# Patient Record
Sex: FEMALE | Race: WHITE | NOT HISPANIC OR LATINO | Employment: FULL TIME | URBAN - METROPOLITAN AREA
[De-identification: names, ages, dates, MRNs, and addresses within clinical notes are randomized per-mention and may not be internally consistent; named-entity substitution may affect disease eponyms.]

---

## 2017-03-08 ENCOUNTER — TRANSCRIBE ORDERS (OUTPATIENT)
Dept: OBSTETRICS AND GYNECOLOGY | Facility: CLINIC | Age: 52
End: 2017-03-08

## 2017-03-08 DIAGNOSIS — Z12.31 VISIT FOR SCREENING MAMMOGRAM: Primary | ICD-10-CM

## 2017-03-17 PROBLEM — Z01.419 WELL WOMAN EXAM WITH ROUTINE GYNECOLOGICAL EXAM: Chronic | Status: ACTIVE | Noted: 2017-03-17

## 2017-04-03 ENCOUNTER — HOSPITAL ENCOUNTER (OUTPATIENT)
Dept: MAMMOGRAPHY | Facility: HOSPITAL | Age: 52
Discharge: HOME | End: 2017-04-03
Attending: OBSTETRICS & GYNECOLOGY | Admitting: OBSTETRICS & GYNECOLOGY

## 2017-04-03 DIAGNOSIS — Z12.31 VISIT FOR SCREENING MAMMOGRAM: ICD-10-CM

## 2017-04-03 PROCEDURE — 77067 SCR MAMMO BI INCL CAD: CPT | Performed by: RADIOLOGY

## 2017-04-03 PROCEDURE — 77063 BREAST TOMOSYNTHESIS BI: CPT | Performed by: RADIOLOGY

## 2017-04-03 PROCEDURE — G0202 SCR MAMMO BI INCL CAD: HCPCS

## 2017-04-03 PROCEDURE — 77063 BREAST TOMOSYNTHESIS BI: CPT

## 2017-11-21 ENCOUNTER — OFFICE VISIT (OUTPATIENT)
Dept: OBSTETRICS AND GYNECOLOGY | Facility: CLINIC | Age: 52
End: 2017-11-21

## 2017-11-21 VITALS
BODY MASS INDEX: 36.15 KG/M2 | HEIGHT: 65 IN | SYSTOLIC BLOOD PRESSURE: 138 MMHG | WEIGHT: 217 LBS | DIASTOLIC BLOOD PRESSURE: 80 MMHG

## 2017-11-21 DIAGNOSIS — Z01.419 WOMEN'S ANNUAL ROUTINE GYNECOLOGICAL EXAMINATION: Primary | ICD-10-CM

## 2017-11-21 PROBLEM — J30.2 CHRONIC SEASONAL ALLERGIC RHINITIS: Status: ACTIVE | Noted: 2017-11-21

## 2017-11-21 PROBLEM — Z97.5 IUD (INTRAUTERINE DEVICE) IN PLACE: Status: ACTIVE | Noted: 2017-11-21

## 2017-11-21 PROBLEM — E03.9 HYPOTHYROID: Status: ACTIVE | Noted: 2017-11-21

## 2017-11-21 PROBLEM — I10 HYPERTENSION: Status: ACTIVE | Noted: 2017-11-21

## 2017-11-21 PROCEDURE — 99396 PREV VISIT EST AGE 40-64: CPT | Performed by: OBSTETRICS & GYNECOLOGY

## 2017-11-21 PROCEDURE — 82272 OCCULT BLD FECES 1-3 TESTS: CPT | Performed by: OBSTETRICS & GYNECOLOGY

## 2017-11-21 RX ORDER — LEVOTHYROXINE SODIUM 0.1 MG/1
100 TABLET ORAL DAILY
COMMUNITY

## 2017-11-21 RX ORDER — LISINOPRIL 30 MG/1
TABLET ORAL
COMMUNITY
Start: 2017-11-11 | End: 2019-11-14

## 2017-11-21 RX ORDER — MONTELUKAST SODIUM 10 MG/1
TABLET ORAL
COMMUNITY
Start: 2017-11-08

## 2017-11-21 RX ORDER — METOPROLOL SUCCINATE 25 MG/1
TABLET, EXTENDED RELEASE ORAL
COMMUNITY
Start: 2017-11-08

## 2017-11-21 NOTE — PROGRESS NOTES
"Subjective   Chief Complaint   Patient presents with   • Annual Exam     no problem     Jasmin Zazueta is a 52 y.o. year old  menopausal female presenting to be seen for her annual exam.  There has not been vaginal bleeding in the last 12 months.  Hot flashes and night sweats are not a significant problem.  She has some hot flashes last year but these have resolved.  Not had a period in years but she's had a Mirena IUD in since 2012.  Could be part of the issue.  No problem with that we can leave this in place a couple more years easily.      Currently dealing with hypertension and so she lost weight.  Also caring for her 95-year-old father. going to [place] this winter so  She will be able to go back to the gym and exercise more.  Encouraged her to walk on day she is not at the gym.    SEXUAL Hx:  She is sexually active.  Vaginal dryness is a problem. OTC lubricant helps  HEALTH Hx:  She exercises regularly: yes. Spinning 2x weekly ; walks  She wears her seat belt:yes.  She has concerns about domestic violence: no.  She has noticed changes in height: no.              Calcium intake is adequate    The following portions of the patient's history were reviewed and updated as appropriate:problem list, current medications, allergies, past family history, past medical history, past social history and past surgical history.    Smoking status: Never Smoker                                                              Smokeless status: Never Used                        Review of Systems   Her bowels and bladder are normal; increased BP and lost 10 lbs on no carbs     Objective   /80  Ht 64.5\" (163.8 cm)  Wt 217 lb (98.4 kg)  LMP Comment: mirena  BMI 36.67 kg/m2     General:  well developed; well nourished  no acute distress  appears stated age   Skin:  No suspicious lesions seen   Thyroid: not examined   Breasts:  Examined in supine position  Symmetric without masses or skin dimpling  Nipples normal " without inversion, lesions or discharge  There are no palpable axillary nodes   Abdomen: soft, non-tender; no masses  no umbilical or inginual hernias are present  no hepato-splenomegaly   Pelvis: Clinical staff was present for exam  External genitalia:  normal appearance of the external genitalia including Bartholin's and [place]'s glands.  :  urethral meatus normal;  Uterus:  normal size, shape and consistency.  Adnexa:  non palpable bilaterally.  Rectal:  anus visually normal appearing. recto-vaginal exam unremarkable and confirms findings; good sphincter tone; no masses; guaiac negative;        Assessment   1.  perimenopausal  2.  amenorrhea possibly due to menopause versus IUD in place  3.  hypertension/overweight  4.  last Pap co-test 2015 for repeat next year  5.  mammograms up-to-date     Plan   1. Calcium discussed  1200 mg daily in divided doses ideally in diet  2. Regular weight bearing exercise  3. Breast self awareness, mammograms discussed  4.  annual.  Leave IUD in place for now    New Medications Ordered This Visit   Medications   • lisinopril (PRINIVIL,ZESTRIL) 30 MG tablet   • metoprolol succinate XL (TOPROL-XL) 25 MG 24 hr tablet   • montelukast (SINGULAIR) 10 MG tablet   • levothyroxine (SYNTHROID, LEVOTHROID) 100 MCG tablet     Sig: Take 100 mcg by mouth Daily.           This note was electronically signed.    Manoj Jones M.D.  November 21, 2017

## 2018-03-20 ENCOUNTER — LAB REQUISITION (OUTPATIENT)
Dept: LAB | Facility: HOSPITAL | Age: 53
End: 2018-03-20

## 2018-03-20 DIAGNOSIS — Z00.00 ROUTINE GENERAL MEDICAL EXAMINATION AT A HEALTH CARE FACILITY: ICD-10-CM

## 2019-11-14 ENCOUNTER — OFFICE VISIT (OUTPATIENT)
Dept: OBSTETRICS AND GYNECOLOGY | Facility: CLINIC | Age: 54
End: 2019-11-14

## 2019-11-14 VITALS
DIASTOLIC BLOOD PRESSURE: 80 MMHG | BODY MASS INDEX: 36.82 KG/M2 | SYSTOLIC BLOOD PRESSURE: 136 MMHG | WEIGHT: 221 LBS | HEIGHT: 65 IN

## 2019-11-14 DIAGNOSIS — Z30.432 ENCOUNTER FOR REMOVAL OF INTRAUTERINE CONTRACEPTIVE DEVICE (IUD): ICD-10-CM

## 2019-11-14 DIAGNOSIS — Z01.419 ENCOUNTER FOR WELL WOMAN EXAM WITH ROUTINE GYNECOLOGICAL EXAM: Primary | ICD-10-CM

## 2019-11-14 DIAGNOSIS — Z01.419 ENCOUNTER FOR GYNECOLOGICAL EXAMINATION WITHOUT ABNORMAL FINDING: ICD-10-CM

## 2019-11-14 DIAGNOSIS — N39.3 SUI (STRESS URINARY INCONTINENCE, FEMALE): ICD-10-CM

## 2019-11-14 PROBLEM — Z97.5 IUD (INTRAUTERINE DEVICE) IN PLACE: Status: RESOLVED | Noted: 2017-11-21 | Resolved: 2019-11-14

## 2019-11-14 PROCEDURE — 58301 REMOVE INTRAUTERINE DEVICE: CPT | Performed by: OBSTETRICS & GYNECOLOGY

## 2019-11-14 PROCEDURE — 99396 PREV VISIT EST AGE 40-64: CPT | Performed by: OBSTETRICS & GYNECOLOGY

## 2019-11-14 RX ORDER — LISINOPRIL 40 MG/1
TABLET ORAL
COMMUNITY
End: 2022-09-02

## 2019-11-14 RX ORDER — HYDROCHLOROTHIAZIDE 25 MG/1
TABLET ORAL
COMMUNITY
Start: 2019-10-09

## 2019-11-14 NOTE — PROGRESS NOTES
Subjective   Chief Complaint   Patient presents with   • Annual Exam     Jasmin Zazueta is a 54 y.o. year old  menopausal female presenting to be seen for her annual exam.  There has not been vaginal bleeding in the last 12 months.  Hot flashes and night sweats are not a significant problem.  She has had an IUD in place about 7 years.  Wants to make sure she cannot get pregnant.  Of 2015 she had an FSH level of 96 which is higher than the midcycle surge.  Reassured her that low but low likelihood of becoming pregnant.  She was going to have that removed today.  She has been having some stress urinary incontinence.  She had an issue where she lost some weight down about 190 but then gained it back after her father  son got  so she knows she needs to get back exercising on a regular basis.  Discussed that this abdominal weight loss can improve her leakage.  We will also have her doing Kegel exercises.  SEXUAL Hx:  She is sexually active.  Vaginal dryness is not a problem.  [place] is painful:no  She has concerns about domestic violence: no    HEALTH Hx:  She exercises regularly: no. Quits in the winter - lost 30 # last year Dad  son   She wears her seat belt:yes.  Self breast awareness: yes  She has noticed changes in height: no              Calcium intake is not adequate 1 daily              Caffeine intake: no or mild caffeine use    The following portions of the patient's history were reviewed and updated as appropriate:problem list, current medications, allergies, past family history, past medical history, past social history and past surgical history.    Current Outpatient Medications:   •  Cetirizine HCl 10 MG capsule, cetirizine 10 mg tablet  one tablet daily, Disp: , Rfl:   •  hydroCHLOROthiazide (HYDRODIURIL) 25 MG tablet, , Disp: , Rfl:   •  levothyroxine (SYNTHROID, LEVOTHROID) 100 MCG tablet, Take 100 mcg by mouth Daily., Disp: , Rfl:   •  lisinopril (PRINIVIL,ZESTRIL) 40  "MG tablet, lisinopril 40 mg tablet  TAKE ONE TABLET BY MOUTH DAILY--due appt, Disp: , Rfl:   •  metoprolol succinate XL (TOPROL-XL) 25 MG 24 hr tablet, , Disp: , Rfl:   •  montelukast (SINGULAIR) 10 MG tablet, , Disp: , Rfl:     Social History    Tobacco Use      Smoking status: Never Smoker      Smokeless tobacco: Never Used    Social History     Substance and Sexual Activity   Alcohol Use Yes       Review of systems  Constitutional   POS nothing reported                           NEG night sweats, sweats and weight gain  Breast               POS nothing reported and had normal mammogram in the past year - results are not in record for review                           NEG persistent breast lump, skin dimpling, breast tenderness or nipple discharge  GI                     POS constipation (chronic)                           NEG bloating, change in bowel habits, melena or reflux symptoms                      POS EDUARDO is present and it IS effecting her ADL's and wearing daily pad                            NEG dysuria, frequency or hematuria         Objective   /80   Ht 164.5 cm (64.75\")   Wt 100 kg (221 lb)   Breastfeeding? No   BMI 37.06 kg/m²      General:  well developed; well nourished  no acute distress  appears stated age   Skin:  No suspicious lesions seen   Thyroid: not examined   Breasts:  Examined in supine position  Symmetric without masses or skin dimpling  Nipples normal without inversion, lesions or discharge  Fibrocystic changes are present both breasts without a discrete mass   Abdomen: soft, non-tender; no masses  no umbilical or inguinal hernias are present  no hepato-splenomegaly   Pelvis: Clinical staff was present for exam  External genitalia:  normal appearance of the external genitalia including Bartholin's and [place]'s glands.  :  urethral meatus normal;  Vaginal:  normal pink mucosa without prolapse or lesions.  Cervix:  normal appearance. Pap obtained IUD string present - 4 cms " in length;  Uterus:  normal size, shape and consistency.  Adnexa:  normal bimanual exam of the adnexa.   She was able to do a moderately strong Kegel today.     IUD Removal    Date of procedure:  11/14/2019    Risks and benefits discussed? yes  All questions answered? yes  Consents given by The patient  Written consent obtained? yes  Reason for removal: Device expiration    Local anesthesia used:  no    Procedure documentation:    A speculum was placed in order to view the cervix.  A tenaculum did not need to be placed on the anterior cervical lip.  The IUD string was easily seen.  The string was grasped and the IUD was removed without difficulty.  The Mirena was removed intact.    She tolerated the procedure without any significant difficulty.  She did notice increased pain immediately upon removal this subsided quickly.    Post procedure instructions: Jasmin Zazueta notified to call with heavy bleeding, fever or increasing pain. Use OTC Nsaid's as needed.        Lab Review   FSH  Imaging review  Mammogram report       Assessment     1. Normal postmenopausal GYN examination  2. IUD removal today device expiration and not needed not on estrogen replacement therapy       Plan   1. Annual or sooner as needed would like her to void regular basis.  Kegel's  2. For 5 minutes daily and as needed she was able to do fairly strong Kegel today.  3. Calcium discussed  1200 mg daily in divided doses ideally in diet family history osteopenia older sisters  4. Regular weight bearing exercise  5. Breast self awareness, mammograms discussed she had one recently at the [facility] report not available      No orders of the defined types were placed in this encounter.     No orders of the defined types were placed in this encounter.             This note was electronically signed.    Manoj Jones M.D.  November 14, 2019

## 2020-03-11 ENCOUNTER — APPOINTMENT (OUTPATIENT)
Dept: GENERAL RADIOLOGY | Facility: HOSPITAL | Age: 55
End: 2020-03-11

## 2020-03-11 ENCOUNTER — HOSPITAL ENCOUNTER (EMERGENCY)
Facility: HOSPITAL | Age: 55
Discharge: HOME | End: 2020-03-11
Attending: EMERGENCY MEDICINE | Admitting: EMERGENCY MEDICINE

## 2020-03-11 VITALS
RESPIRATION RATE: 20 BRPM | BODY MASS INDEX: 35.82 KG/M2 | DIASTOLIC BLOOD PRESSURE: 77 MMHG | HEIGHT: 65 IN | WEIGHT: 215 LBS | OXYGEN SATURATION: 92 % | SYSTOLIC BLOOD PRESSURE: 145 MMHG | HEART RATE: 132 BPM | TEMPERATURE: 100.7 F

## 2020-03-11 DIAGNOSIS — J10.1 INFLUENZA A: Primary | ICD-10-CM

## 2020-03-11 LAB
FLUAV SUBTYP SPEC NAA+PROBE: DETECTED
FLUBV RNA ISLT QL NAA+PROBE: NOT DETECTED

## 2020-03-11 PROCEDURE — 87502 INFLUENZA DNA AMP PROBE: CPT | Performed by: EMERGENCY MEDICINE

## 2020-03-11 PROCEDURE — 71045 X-RAY EXAM CHEST 1 VIEW: CPT

## 2020-03-11 PROCEDURE — 99284 EMERGENCY DEPT VISIT MOD MDM: CPT

## 2020-03-11 RX ORDER — ACETAMINOPHEN 500 MG
1000 TABLET ORAL ONCE
Status: COMPLETED | OUTPATIENT
Start: 2020-03-11 | End: 2020-03-11

## 2020-03-11 RX ADMIN — ACETAMINOPHEN 1000 MG: 500 TABLET, FILM COATED ORAL at 15:48

## 2020-03-11 NOTE — ED PROVIDER NOTES
Subjective   Jasmin Zazueta is a 54 y.o. female who presents to the ED with c/o fever and cough. The patient reports onset of a cough 3 days with a fever developing yesterday. She recently flew back from [place] from the [facility] 3 days ago and she works at [employer]. The patient has not had recent travel to other cities in the [place] or outside of the country. She complains of rhinorrhea, sore throat, postnasal drip, and fatigue but denies myalgias. The patient is currently taking 40 mg of Lisinopril and 100 mcg of Levothyroxine. She has a past medical history of disease of thyroid gland and hypertension. There are no other acute complaints at this time.      History provided by:  Patient  Fever   Temp source:  Oral  Severity:  Moderate  Onset quality:  Sudden  Duration:  2 days  Timing:  Constant  Progression:  Worsening  Chronicity:  New  Relieved by:  Nothing  Worsened by:  Nothing  Ineffective treatments: rest.  Associated symptoms: congestion, cough, rhinorrhea and sore throat    Associated symptoms: no chest pain, no chills, no confusion, no headaches, no myalgias, no nausea and no vomiting    Risk factors: recent travel and sick contacts    Risk factors: no contaminated food, no contaminated water and no hx of cancer        Review of Systems   Constitutional: Positive for fatigue and fever. Negative for chills.   HENT: Positive for congestion, postnasal drip, rhinorrhea and sore throat.    Respiratory: Positive for cough.    Cardiovascular: Negative for chest pain.   Gastrointestinal: Negative for nausea and vomiting.   Musculoskeletal: Negative for myalgias.   Neurological: Negative for syncope, weakness and headaches.   Psychiatric/Behavioral: Negative for confusion.   All other systems reviewed and are negative.      Past Medical History:   Diagnosis Date   • Disease of thyroid gland    • Dislocated shoulder    • Hypertension    • Knee pain        No Known Allergies    Past Surgical  History:   Procedure Laterality Date   • TONSILLECTOMY         Family History   Problem Relation Age of Onset   • Hypertension Other    • Thyroid disease Other    • Lymphoma Other    • Lung cancer Other    • Lung cancer Mother          57 yo lymphoma non smoker   • Cancer Father         94 bladder/prostate/bone    • Osteopenia Sister        Social History     Socioeconomic History   • Marital status:      Spouse name: Not on file   • Number of children: Not on file   • Years of education: Not on file   • Highest education level: Not on file   Tobacco Use   • Smoking status: Never Smoker   • Smokeless tobacco: Never Used   Substance and Sexual Activity   • Alcohol use: Yes   • Drug use: No   • Sexual activity: Yes         Objective   Physical Exam   Constitutional: She is oriented to person, place, and time. She appears well-developed and well-nourished. No distress.   HENT:   Head: Normocephalic and atraumatic.   Eyes: Conjunctivae are normal. No scleral icterus.   Neck: Normal range of motion. Neck supple.   Cardiovascular: Regular rhythm and normal heart sounds. Tachycardia present.   No murmur heard.  Pulmonary/Chest: Effort normal and breath sounds normal. No respiratory distress.   Abdominal: Soft. There is no tenderness. There is no rebound and no guarding.   Musculoskeletal: Normal range of motion. She exhibits no edema.   Neurological: She is alert and oriented to person, place, and time.   Skin: Skin is warm and dry.   Psychiatric: She has a normal mood and affect. Her behavior is normal.   Nursing note and vitals reviewed.      Procedures         ED Course  ED Course as of Mar 11 2031   Wed Mar 11, 2020   4529 The charge nurse spoke with the [facility] department and we were informed that patient will not be receiving authorization for COVID testing.    [BW]      ED Course User Index  [BW] Juan Manuel Briceno MD             Flu positive. Pt counseled.                                MDM  Number of Diagnoses or Management Options  Influenza A:      Amount and/or Complexity of Data Reviewed  Clinical lab tests: ordered and reviewed  Tests in the radiology section of CPT®: ordered and reviewed  Independent visualization of images, tracings, or specimens: yes        Final diagnoses:   Influenza A       Documentation assistance provided by nelia Bird.  Information recorded by the scribe was done at my direction and has been verified and validated by me.     Tyrell Bird  03/11/20 [id number]       Juan Manuel Briceno MD  03/11/20 2034

## 2022-09-15 ENCOUNTER — OFFICE VISIT (OUTPATIENT)
Dept: ORTHOPEDIC SURGERY | Facility: CLINIC | Age: 57
End: 2022-09-15

## 2022-09-15 VITALS
SYSTOLIC BLOOD PRESSURE: 126 MMHG | BODY MASS INDEX: 38.2 KG/M2 | WEIGHT: 229.28 LBS | DIASTOLIC BLOOD PRESSURE: 74 MMHG | HEIGHT: 65 IN

## 2022-09-15 DIAGNOSIS — S42.254A NONDISPLACED FRACTURE OF GREATER TUBEROSITY OF RIGHT HUMERUS, INITIAL ENCOUNTER FOR CLOSED FRACTURE: ICD-10-CM

## 2022-09-15 DIAGNOSIS — M25.511 RIGHT SHOULDER PAIN, UNSPECIFIED CHRONICITY: Primary | ICD-10-CM

## 2022-09-15 PROCEDURE — 99204 OFFICE O/P NEW MOD 45 MIN: CPT | Performed by: PHYSICIAN ASSISTANT

## 2022-09-15 NOTE — PROGRESS NOTES
[facility] [place] [facility] Note        Subjective     Pain of the Right Shoulder      HPI    Jasmin Zazueta is a 57 y.o. female.  This is a very pleasant right-hand-dominant female presenting today discuss her right shoulder pain.  She fell on 2022 landing on her right side.  She complains of pain with motion.  At this point 2 weeks out it is a pain 0/21/10 but she has been in a sling.  She was seen in urgent treatment with x-rays on 2022.    Past Medical History:   Diagnosis Date   • Disease of thyroid gland    • Dislocated shoulder    • Hypertension    • Knee pain       Past Surgical History:   Procedure Laterality Date   • TONSILLECTOMY        Family History   Problem Relation Age of Onset   • Hypertension Other    • Thyroid disease Other    • Lymphoma Other    • Lung cancer Other    • Lung cancer Mother          57 yo lymphoma non smoker   • Cancer Father         94 bladder/prostate/bone    • Osteopenia Sister      Social History     Socioeconomic History   • Marital status:    Tobacco Use   • Smoking status: Never Smoker   • Smokeless tobacco: Never Used   Vaping Use   • Vaping Use: Never used   Substance and Sexual Activity   • Alcohol use: Yes     Comment: WEEKLY   • Drug use: No   • Sexual activity: Yes      Current Outpatient Medications on File Prior to Visit   Medication Sig Dispense Refill   • Cetirizine HCl 10 MG capsule cetirizine 10 mg tablet   one tablet daily     • Cyanocobalamin 1000 MCG sublingual tablet cyanocobalamin (vit B-12) 1,000 mcg sublingual tablet   Place 1 tablet every day by sublingual route.     • hydroCHLOROthiazide (HYDRODIURIL) 25 MG tablet      • levothyroxine (SYNTHROID, LEVOTHROID) 100 MCG tablet Take 100 mcg by mouth Daily.     • losartan (COZAAR) 100 MG tablet Take 100 mg by mouth Daily.     • metoprolol succinate XL (TOPROL-XL) 25 MG 24 hr tablet      • montelukast (SINGULAIR) 10 MG tablet      • lidocaine (LIDODERM) 5 % Place 1 patch on the skin  "as directed by provider Daily. Remove & Discard patch within 12 hours or as directed by MD 15 patch 0   • triamcinolone (KENALOG) 0.1 % cream      • [DISCONTINUED] acetaminophen (TYLENOL) 500 MG tablet Take 2 tablets by mouth Every 6 (Six) Hours As Needed (arm/shoulder pain). 40 tablet 0   • [DISCONTINUED] diclofenac (VOLTAREN) 50 MG EC tablet Take 1 tablet by mouth 3 (Three) Times a Day As Needed (shoulder pain). 30 tablet 0     No current facility-administered medications on file prior to visit.      No Known Allergies       Review of Systems   Constitutional: Negative.    HENT: Negative.    Eyes: Negative.    Respiratory: Negative.    Cardiovascular: Negative.    Gastrointestinal: Negative.    Endocrine: Negative.    Genitourinary: Negative.    Musculoskeletal: Positive for arthralgias.   Skin: Negative.    Allergic/Immunologic: Negative.    Neurological: Negative.    Hematological: Negative.    Psychiatric/Behavioral: Negative.         I reviewed the patient's chief complaint, history of present illness, review of systems, past medical history, surgical history, family history, social history, medications and allergy list.        Objective      Physical Exam  /74   Ht 165.1 cm (65\")   Wt 104 kg (229 lb 4.5 oz)   BMI 38.15 kg/m²     Body mass index is 38.15 kg/m².    General  Mental Status - alert  General Appearance - cooperative, well groomed, not in acute distress  Orientation - Oriented X3  Build & Nutrition - well developed and well nourished  Posture - normal posture  Gait -normal       Ortho Exam  Left shoulder exam: Mildly tender over the greater tuberosity.  Intact rotator cuff strength.  Normal motion of the elbow wrist patient able to make a composite fist with good strength.  Neurovascular intact distally.  Pulses 2+.    Imaging/Studies  Imaging Results (Last 24 Hours)     Procedure Component Value Units Date/Time    XR Shoulder 2+ View Right [[id number]] Resulted: 09/15/22 0902     Updated: " 09/15/22 0908    Addenda:        Addendum: After second review of the study, patient does appear to have a   lucency originating at the greater tuberosity and traveling to the   metadiaphyseal junction but not crossing the cortical edge.  This is most   consistent with a nondisplaced fracture.  Signed: 09/15/22 0908 by Onofre Kuhn MD    Narrative:      Right Shoulder X-Ray    Indication: Pain    Study:  AP, axillary lateral, and scapular Y views    Comparison: None    Findings:  No acute fractures are visualized  No bony lesions are visualized.  Normal soft tissue appearance  AC joint: Severe hypertrophic joint space narrowing  Glenohumeral joint: Minimal joint space narrowing  Acromion type: 1      Impression:    No acute bony abnormalities noted  Type I acromion  Severe hypertrophic AC joint arthritis              Assessment    Assessment:  1. Right shoulder pain, unspecified chronicity    2. Nondisplaced fracture of greater tuberosity of right humerus, initial encounter for closed fracture          Plan:  Recommend over-the-counter medication as needed for discomfort  Nondisplaced right greater tuberosity fracture.  I personally reviewed x-rays from 9/2/2022, today's x-rays clinical findings with the patient.  Recommendation today is that she continue with the sling use as she has been doing for 2 weeks.  We will see her back in 2 weeks with repeat x-rays with hopes of getting her moving at that point.  She will return sooner if needed.    History, diagnosis and treatment plan discussed with Dr. Kuhn.          Blanka Ceballos PA-C  09/15/22  09:11 EDT

## 2022-09-30 ENCOUNTER — OFFICE VISIT (OUTPATIENT)
Dept: ORTHOPEDIC SURGERY | Facility: CLINIC | Age: 57
End: 2022-09-30

## 2022-09-30 VITALS
SYSTOLIC BLOOD PRESSURE: 150 MMHG | WEIGHT: 229.28 LBS | HEIGHT: 65 IN | BODY MASS INDEX: 38.2 KG/M2 | DIASTOLIC BLOOD PRESSURE: 100 MMHG

## 2022-09-30 DIAGNOSIS — S42.254D NONDISPLACED FRACTURE OF GREATER TUBEROSITY OF RIGHT HUMERUS, SUBSEQUENT ENCOUNTER FOR FRACTURE WITH ROUTINE HEALING: Primary | ICD-10-CM

## 2022-09-30 PROCEDURE — 99212 OFFICE O/P EST SF 10 MIN: CPT | Performed by: PHYSICIAN ASSISTANT

## 2022-09-30 NOTE — PROGRESS NOTES
"        [facility] [place] [facility] Note        Subjective     CC: Follow-up (2 week recheck -Nondisplaced fracture of greater tuberosity of right humerus)      VAUGHN Zazueta is a 57 y.o. female.  Patient returns today for her nondisplaced greater tuberosity fracture.  She has been in a sling.  She is now 1 month out of injury.  She reports minimal pain.  No new symptoms.  She has been taking the sling off at home occasionally.    Overall, patient's symptoms are improved    ROS:    Constiutional:Pt denies fever, chills, nausea, or vomiting.  MSK:as above        Objective      Past Medical History  Past Medical History:   Diagnosis Date   • Disease of thyroid gland    • Dislocated shoulder    • Hypertension    • Knee pain          Physical Exam  /100   Ht 165.1 cm (65\")   Wt 104 kg (229 lb 4.5 oz)   BMI 38.15 kg/m²     Body mass index is 38.15 kg/m².    Patient is well nourished and well developed.        Ortho Exam  Right shoulder exam: Mildly tender over the greater tuberosity.  Intact rotator cuff strength.  Neurovascular intact distally.    Imaging/Labs/EMG Reviewed:  Imaging Results (Last 24 Hours)     Procedure Component Value Units Date/Time    XR Shoulder 2+ View Right [[id number]] Resulted: 09/30/22 1218     Updated: 09/30/22 1224    Narrative:      Imaging: shoulder x-rays 3 views - AP, axillary, and scapular-Y x-ray   views    Side: RIGHT SHOULDER    Indication for shoulder x-ray 3 views: shoulder pain    Comparison: Post injury comparison views available    Findings:   Right shoulder 3 views shows perhaps slight evidence of the known greater   tuberosity fracture.  Overall the slight fracture that was noted on the   initial films is barely even visible at this time with interval healing   noted.    I personally reviewed the above x-rays and discussed with HOMER Patino.              Assessment    Assessment:  1. Nondisplaced fracture of greater tuberosity of right humerus, " subsequent encounter for fracture with routine healing        Plan:  Recommend over the counter anti-inflammatories for pain and/or swelling  Right greater tuberosity fracture.  We reviewed today's x-rays clinical findings with the patient.  Plan today is to get her started into formal physical therapy to get action rotation and strength.  She may discontinue the sling.  Return to see me in 4 to 6 weeks with repeat x-rays or sooner if needed.    History, diagnosis and treatment plan discussed with Dr. Méndez.        Blanka Ceballos PA-C  09/30/22  17:17 EDT

## 2022-10-06 ENCOUNTER — HOSPITAL ENCOUNTER (OUTPATIENT)
Dept: PHYSICAL THERAPY | Facility: HOSPITAL | Age: 57
Setting detail: THERAPIES SERIES
Discharge: HOME | End: 2022-10-06

## 2022-10-06 DIAGNOSIS — S42.254D NONDISPLACED FRACTURE OF GREATER TUBEROSITY OF RIGHT HUMERUS, SUBSEQUENT ENCOUNTER FOR FRACTURE WITH ROUTINE HEALING: Primary | ICD-10-CM

## 2022-10-06 PROCEDURE — 97161 PT EVAL LOW COMPLEX 20 MIN: CPT | Performed by: GENERAL ACUTE CARE HOSPITAL

## 2022-10-06 NOTE — THERAPY EVALUATION
Outpatient Physical Therapy Ortho Initial Evaluation   [facility]     Patient Name: Jasmin Zazueta  : 1965  MRN: [id number]  Today's Date: 10/6/2022      Visit Date: 10/06/2022    Patient Active Problem List   Diagnosis   • Chronic seasonal allergic rhinitis   • Hypertension   • Hypothyroid   • EDUARDO (stress urinary incontinence, female)        Past Medical History:   Diagnosis Date   • Disease of thyroid gland    • Dislocated shoulder    • Hypertension    • Knee pain         Past Surgical History:   Procedure Laterality Date   • TONSILLECTOMY         Visit Dx:     ICD-10-CM ICD-9-CM   1. Nondisplaced fracture of greater tuberosity of right humerus, subsequent encounter for fracture with routine healing  S42.254D V54.11          Patient History     Row Name 10/06/22 1345             History    Chief Complaint Difficulty with daily activities;Fatigue/poor endurance;Joint stiffness;Muscle weakness;Pain  -HP      Type of Pain Shoulder pain  -HP      Date Current Problem(s) Began 22  -      Brief Description of Current Complaint Patient states that she fell while at work on her right shoulder leading to a greater tuberosity fracture of the right shoulder.  Patient was in a sling from the time of the fracture until .  Patient states that overall she has stiffness in the right shoulder limiting her use and ability to complete activities.  She states that her pain is sharp in nature with overhead movement of the right shoulder the greatest.  -HP      Patient/Caregiver Goals Relieve pain;Return to prior level of function;Improve mobility;Improve strength;Know what to do to help the symptoms  -HP      Hand Dominance right-handed  -HP      Occupation/sports/leisure activities Employed at the [employer]  -      What clinical tests have you had for this problem? X-ray  -              Pain     Pain Location Shoulder  -HP      Pain at Present 0  -HP      Pain at Best 0  -HP      Pain at Worst 9  -HP       Pain Frequency Intermittent  -HP      Pain Description Aching;Discomfort;Sharp;Shooting;Stabbing;Throbbing  -HP      What Performance Factors Make the Current Problem(s) WORSE? Overhead movement  -      What Performance Factors Make the Current Problem(s) BETTER? Rest  -HP      Is your sleep disturbed? Yes  -HP              Fall Risk Assessment    Any falls in the past year: Yes  -HP      Factors that contributed to the fall: Tripped  -HP              Daily Activities    Primary Language English  -      Pt Participated in POC and Goals Yes  -            User Key  (r) = Recorded By, (t) = Taken By, (c) = Cosigned By    Initials Name Provider Type     Abundio Peterson PT Physical Therapist                 PT Ortho     Row Name 10/06/22 7420       Precautions and Contraindications    Precautions/Limitations no known precautions/limitations  -       Posture/Observations    Posture/Observations Comments Patient had mild TTP along the right shoulder joint and specifically along the right biceps musculature.  -HP       Quarter Clearing    Quarter Clearing Upper Quarter Clearing  -HP       Sensory Screen for Light Touch- Upper Quarter Clearing    C4 (posterior shoulder) Intact  -HP    C5 (lateral upper arm) Intact  -HP    C6 (tip of thumb) Intact  -HP    C7 (tip of 3rd finger) Intact  -HP    C8 (tip of 5th finger) Intact  -HP    T1 (medial lower arm) Intact  -HP       General ROM    RT Upper Ext Rt Shoulder Flexion;Rt Shoulder ABduction  -HP       Right Upper Ext    Rt Shoulder Abduction AROM 92  -HP    Rt Shoulder Flexion AROM 156  -HP       MMT (Manual Muscle Testing)    General MMT Comments Deferred formal MMT at this time  -          User Key  (r) = Recorded By, (t) = Taken By, (c) = Cosigned By    Initials Name Provider Type    Abundio Sandhu PT Physical Therapist                            Therapy Education  Education Details: HEP included: pulley in flexion, table slide in scaption/flexion/abd  Given:  HEP, Symptoms/condition management, Pain management, Posture/body mechanics  Program: New  How Provided: Verbal, Demonstration, Written  Provided to: Patient  Level of Understanding: Teach back education performed, Verbalized, Demonstrated      PT OP Goals     Row Name 10/06/22 1345          PT Short Term Goals    STG Date to Achieve 11/03/22  -HP     STG 1 Patient to report improvement of symptoms by 50% or greater.  -HP     STG 1 Progress New  -     STG 2 Patient to report adherence to HEP.  -HP     STG 2 Progress New  -     STG 3 Patient to demonstrate full active range of motion of the right shoulder in all planes of motion without pain or symptoms.  -HP     STG 3 Progress New  -            Long Term Goals    LTG 1 Patient to report improvement of symptoms by 75% or greater.  -HP     LTG 1 Progress New  -HP     LTG 2 Patient to report independence with HEP.  -HP     LTG 2 Progress New  -HP     LTG 3 Patient to report return to leisure activities/ADLs without pain or symptoms in the right shoulder.  -HP     LTG 3 Progress New  -     LTG 4 Patient to report decrease in QuickDASH score to less than or equal to 10.  -     LTG 4 Progress New  -            Time Calculation    PT Goal Re-Cert Due Date 01/04/23  -           User Key  (r) = Recorded By, (t) = Taken By, (c) = Cosigned By    Initials Name Provider Type    Abundio Sandhu, PT Physical Therapist                 PT Assessment/Plan     Row Name 10/06/22 1345          PT Assessment    Functional Limitations Limitations in functional capacity and performance;Performance in leisure activities;Performance in self-care ADL;Performance in work activities  -     Impairments Joint mobility;Muscle strength;Pain;Range of motion  -     Assessment Comments Patient is a 57-year-old female that presents with a low complex and evolving case of right greater tuberosity fracture.  Patient states that she was working when she tripped and fell leading to the  fracture.  An x-ray recently revealed that she has had adequate healing of her fracture.  She was able to perform full active range of motion of the right shoulder in flexion but required increased time in order to complete.  She was limited in her ability to complete right shoulder abduction due to limited ability to perform due to weakness and pain.  Overall, patient is doing well to this point and presents with noticeable stiffness of the right shoulder limiting her use the greatest.  -     Please refer to paper survey for additional self-reported information Yes  -HP     Rehab Potential Good  -HP     Patient/caregiver participated in establishment of treatment plan and goals Yes  -HP     Patient would benefit from skilled therapy intervention Yes  -HP            PT Plan    PT Frequency 1x/week;2x/week  -     Predicted Duration of Therapy Intervention (PT) 8-10 visits  -     Planned CPT's? PT EVAL LOW COMPLEXITY: 79703;PT THER PROC EA 15 MIN: 39776;PT THER ACT EA 15 MIN: 03273;PT MANUAL THERAPY EA 15 MIN: 03874;PT NEUROMUSC RE-EDUCATION EA 15 MIN: 72215  -     Physical Therapy Interventions (Optional Details) gross motor skills;home exercise program;joint mobilization;manual therapy techniques;modalities;motor coordination training;neuromuscular re-education;patient/family education;ROM (Range of Motion);strengthening;stretching  -     PT Plan Comments Patient to benefit from further skilled therapy with a focus on improving active range of motion of the right upper extremity, improving strength, decreasing pain, improving functional ability, and returning to prior level of function.  -           User Key  (r) = Recorded By, (t) = Taken By, (c) = Cosigned By    Initials Name Provider Type     Abundio Peterson PT Physical Therapist                                    Outcome Measure Options: Quick DASH  Quick DASH  Open a tight or new jar.: No Difficulty  Do heavy household chores (e.g., wash walls,  wash floors): Moderate Difficulty  Carry a shopping bag or briefcase: Mild Difficulty  Wash your back: Moderate Difficulty  Use a knife to cut food: No Difficulty  Recreational activities in which you take some force or impact through your arm, should or hand (e.g. golf, hammering, tennis, etc.): Moderate Difficulty  During the past week, to what extent has your arm, shoulder, or hand problem interfered with your normal social activites with family, friends, neighbors or groups?: Not at all  During the past week, were you limited in your work or other regular daily activities as a result of your arm, shoulder or hand problem?: Slightly Limited  Arm, Shoulder, or hand pain: Mild  Tingling (pins and needles) in your arm, shoulder, or hand: None  During the past week, how much difficulty have you had sleeping because of the pain in your arm, shoulder or hand?: Mild Difficulty  Number of Questions Answered: 11  Quick DASH Score: 22.73         Time Calculation:     Start Time: 1345  Untimed Charges  PT Eval/Re-eval Minutes: 60  Total Minutes  Untimed Charges Total Minutes: 60   Total Minutes: 60     Therapy Charges for Today     Code Description Service Date Service Provider Modifiers Qty    10037803252 HC PT EVAL LOW COMPLEXITY 4 10/6/2022 Abundio Peterson, PT GP 1          PT G-Codes  Outcome Measure Options: Quick DASH  Quick DASH Score: 22.73         Abundio Peterson PT  10/6/2022

## 2022-10-17 ENCOUNTER — HOSPITAL ENCOUNTER (OUTPATIENT)
Dept: PHYSICAL THERAPY | Facility: HOSPITAL | Age: 57
Setting detail: THERAPIES SERIES
Discharge: HOME | End: 2022-10-17

## 2022-10-17 DIAGNOSIS — S42.254D NONDISPLACED FRACTURE OF GREATER TUBEROSITY OF RIGHT HUMERUS, SUBSEQUENT ENCOUNTER FOR FRACTURE WITH ROUTINE HEALING: Primary | ICD-10-CM

## 2022-10-17 PROCEDURE — 97110 THERAPEUTIC EXERCISES: CPT | Performed by: GENERAL ACUTE CARE HOSPITAL

## 2022-10-17 PROCEDURE — 97140 MANUAL THERAPY 1/> REGIONS: CPT | Performed by: GENERAL ACUTE CARE HOSPITAL

## 2022-10-17 NOTE — THERAPY TREATMENT NOTE
Outpatient Physical Therapy Ortho Treatment Note   [facility]     Patient Name: Jasmin Zazueta  : 1965  MRN: [id number]  Today's Date: 10/17/2022      Visit Date: 10/17/2022    Visit Dx:    ICD-10-CM ICD-9-CM   1. Nondisplaced fracture of greater tuberosity of right humerus, subsequent encounter for fracture with routine healing  S42.254D V54.11       Patient Active Problem List   Diagnosis   • Chronic seasonal allergic rhinitis   • Hypertension   • Hypothyroid   • EDUARDO (stress urinary incontinence, female)        Past Medical History:   Diagnosis Date   • Disease of thyroid gland    • Dislocated shoulder    • Hypertension    • Knee pain         Past Surgical History:   Procedure Laterality Date   • TONSILLECTOMY                          PT Assessment/Plan     Row Name 10/17/22 0900          PT Assessment    Assessment Comments Patient did well with today's exercises and was able to demonstrate improvement in her active range of motion compared to her initial evaluation.  Overall, patient is doing well.  She remains limited in her endurance and strength based exercises the greatest.  -HP        PT Plan    PT Plan Comments Continue per plan of care and progressing patient as necessary.  -HP           User Key  (r) = Recorded By, (t) = Taken By, (c) = Cosigned By    Initials Name Provider Type    Abundio Sandhu, PT Physical Therapist                   OP Exercises     Row Name 10/17/22 0900             Subjective Pain    Able to rate subjective pain? yes  -HP      Pre-Treatment Pain Level 0  -HP      Post-Treatment Pain Level 0  -HP         Total Minutes    80996 - PT Therapeutic Exercise Minutes 30  -HP      80004 - PT Manual Therapy Minutes 10  -HP         Exercise 1    Exercise Name 1 UBE  -HP      Time 1 2 min fwd and bwd  -HP      Additional Comments L10  -HP         Exercise 2    Exercise Name 2 Row both   -HP      Sets 2 2  -HP      Reps 2 10  -HP      Additional Comments 1 pl  -HP          Exercise 3    Exercise Name 3 Ladder step back  -HP      Sets 3 2  -HP      Reps 3 10  -HP         Exercise 4    Exercise Name 4 Shoulder flexion and abd at wall  -HP      Reps 4 10 each  -HP         Exercise 5    Exercise Name 5 Shoulder flexion and abd lift off at wall  -HP      Reps 5 10 each  -HP         Exercise 6    Exercise Name 6 2# bar supine shoulder: flexion, chest press, horizontal abd/add, scaption  -HP      Reps 6 10 each  -HP         Exercise 7    Exercise Name 7 Sidelying shoulder: ER, abd, flexion-ext  -HP      Reps 7 10 each  -HP            User Key  (r) = Recorded By, (t) = Taken By, (c) = Cosigned By    Initials Name Provider Type     Abundio Peterson, PT Physical Therapist                         Manual Rx (last 36 hours)     Manual Treatments     Row Name 10/17/22 0900             Total Minutes    53335 - PT Manual Therapy Minutes 10  -HP         Manual Rx 1    Manual Rx 1 Location R shoulder  -HP      Manual Rx 1 Type Soft tissue mobilization with use of tools with light to moderate pressure along the entirety of the right shoulder musculature  -      Manual Rx 1 Duration 10  -HP            User Key  (r) = Recorded By, (t) = Taken By, (c) = Cosigned By    Initials Name Provider Type     Abundio Peterson, PT Physical Therapist                                   Time Calculation:   Start Time: 0900  Timed Charges  42071 - PT Therapeutic Exercise Minutes: 30  92531 - PT Manual Therapy Minutes: 10  Total Minutes  Timed Charges Total Minutes: 40   Total Minutes: 40  Therapy Charges for Today     Code Description Service Date Service Provider Modifiers Qty    29901852590  PT THER PROC EA 15 MIN 10/17/2022 Abundio Peterson, PT GP 2    [id number]  PT MANUAL THERAPY EA 15 MIN 10/17/2022 Abundio Peterson, PT GP 1                    Abundio Peterson PT  10/17/2022

## 2022-10-27 ENCOUNTER — HOSPITAL ENCOUNTER (OUTPATIENT)
Dept: PHYSICAL THERAPY | Facility: HOSPITAL | Age: 57
Setting detail: THERAPIES SERIES
Discharge: HOME | End: 2022-10-27

## 2022-10-27 DIAGNOSIS — S42.254D NONDISPLACED FRACTURE OF GREATER TUBEROSITY OF RIGHT HUMERUS, SUBSEQUENT ENCOUNTER FOR FRACTURE WITH ROUTINE HEALING: Primary | ICD-10-CM

## 2022-10-27 PROCEDURE — 97110 THERAPEUTIC EXERCISES: CPT | Performed by: GENERAL ACUTE CARE HOSPITAL

## 2022-10-27 NOTE — THERAPY TREATMENT NOTE
Outpatient Physical Therapy Ortho Treatment Note  [facility]     Patient Name: Jasmin Zazueta  : 1965  MRN: [id number]  Today's Date: 10/27/2022      Visit Date: 10/27/2022    Visit Dx:    ICD-10-CM ICD-9-CM   1. Nondisplaced fracture of greater tuberosity of right humerus, subsequent encounter for fracture with routine healing  S42.254D V54.11       Patient Active Problem List   Diagnosis   • Chronic seasonal allergic rhinitis   • Hypertension   • Hypothyroid   • EDUARDO (stress urinary incontinence, female)        Past Medical History:   Diagnosis Date   • Disease of thyroid gland    • Dislocated shoulder    • Hypertension    • Knee pain         Past Surgical History:   Procedure Laterality Date   • TONSILLECTOMY                          PT Assessment/Plan     Row Name 10/27/22 1025          PT Assessment    Assessment Comments Pt doing well with therapy, remains limited by endurance the greatest. Overall, made great progress with therapy.  -HP        PT Plan    PT Plan Comments Continue per POC  -HP           User Key  (r) = Recorded By, (t) = Taken By, (c) = Cosigned By    Initials Name Provider Type    Abundio Sandhu, PT Physical Therapist                   OP Exercises     Row Name 10/27/22 1025             Subjective Comments    Subjective Comments Pt states that she is having her greatest pain when performing lifting of objects and controlling them to put them back down  -HP         Subjective Pain    Able to rate subjective pain? yes  -HP      Pre-Treatment Pain Level 2  -HP      Post-Treatment Pain Level 1  -HP         Total Minutes    39048 - PT Therapeutic Exercise Minutes 45  -HP         Exercise 1    Exercise Name 1 UBE  -HP      Time 1 2 min fwd and 2 min bwd  -HP      Additional Comments L10  -HP         Exercise 2    Exercise Name 2 Row machine both   -HP      Sets 2 2  -HP      Reps 2 10  -HP      Additional Comments 3pl/ 2pl  -HP         Exercise 3    Exercise Name 3 Standing lat  pulldown and face pulls  -HP      Sets 3 2  -HP      Reps 3 10 each  -HP         Exercise 4    Exercise Name 4 5# KB shoulder punches, chest press  -HP      Sets 4 2  -HP      Reps 4 10 each  -HP         Exercise 5    Exercise Name 5 Supine 4# DB shoulder flexion, chest press, open books  -HP      Sets 5 2  -HP      Reps 5 10  -HP         Exercise 6    Exercise Name 6 3# DB: shoulder flexion, scaption, shoulder press  -HP      Sets 6 2  -HP      Reps 6 10  -HP         Exercise 7    Exercise Name 7 3# DB shoulder drops  -HP      Sets 7 2  -HP      Reps 7 10  -HP            User Key  (r) = Recorded By, (t) = Taken By, (c) = Cosigned By    Initials Name Provider Type    HP Abundio Peterson, PT Physical Therapist                                                Time Calculation:   Start Time: 1025  Timed Charges  90552 - PT Therapeutic Exercise Minutes: 45  Total Minutes  Timed Charges Total Minutes: 45   Total Minutes: 45  Therapy Charges for Today     Code Description Service Date Service Provider Modifiers Qty    97986925117 HC PT THER PROC EA 15 MIN 10/27/2022 Abundio Peterson, PT GP 3                    Abundio Peterson PT  10/27/2022

## 2022-11-10 ENCOUNTER — HOSPITAL ENCOUNTER (OUTPATIENT)
Dept: PHYSICAL THERAPY | Facility: HOSPITAL | Age: 57
Setting detail: THERAPIES SERIES
Discharge: HOME | End: 2022-11-10

## 2022-11-10 DIAGNOSIS — S42.254D NONDISPLACED FRACTURE OF GREATER TUBEROSITY OF RIGHT HUMERUS, SUBSEQUENT ENCOUNTER FOR FRACTURE WITH ROUTINE HEALING: Primary | ICD-10-CM

## 2022-11-10 PROCEDURE — 97110 THERAPEUTIC EXERCISES: CPT | Performed by: GENERAL ACUTE CARE HOSPITAL

## 2022-11-10 NOTE — THERAPY PROGRESS REPORT/RE-CERT
Outpatient Physical Therapy Ortho Progress Note  [facility]     Patient Name: Jasmin Zazueta  : 1965  MRN: [id number]  Today's Date: 11/10/2022      Visit Date: 11/10/2022    Visit Dx:    ICD-10-CM ICD-9-CM   1. Nondisplaced fracture of greater tuberosity of right humerus, subsequent encounter for fracture with routine healing  S42.254D V54.11       Patient Active Problem List   Diagnosis   • Chronic seasonal allergic rhinitis   • Hypertension   • Hypothyroid   • EDUARDO (stress urinary incontinence, female)        Past Medical History:   Diagnosis Date   • Disease of thyroid gland    • Dislocated shoulder    • Hypertension    • Knee pain         Past Surgical History:   Procedure Laterality Date   • TONSILLECTOMY          PT Ortho     Row Name 11/10/22 0839       General ROM    RT Upper Ext Rt Shoulder Extension;Rt Shoulder Flexion;Rt Shoulder External Rotation;Rt Shoulder Internal Rotation;Rt Shoulder ABduction  -HP       Right Upper Ext    Rt Shoulder Abduction AROM 145  -HP    Rt Shoulder Extension AROM 35  -HP    Rt Shoulder Flexion AROM 156  -HP    Rt Shoulder External Rotation AROM T3 (functional reach)  -HP    Rt Shoulder Internal Rotation AROM T10 (functional reach)  -HP       MMT (Manual Muscle Testing)    Rt Upper Ext Rt Shoulder Flexion;Rt Shoulder Extension;Rt Shoulder ABduction;Rt Shoulder Internal Rotation;Rt Shoulder External Rotation  -HP       MMT Right Upper Ext    Rt Shoulder Flexion MMT, Gross Movement (4+/5) good plus  -HP    Rt Shoulder Extension MMT, Gross Movement (4+/5) good plus  -HP    Rt Shoulder ABduction MMT, Gross Movement (4/5) good  -HP    Rt Shoulder Internal Rotation MMT, Gross Movement (4+/5) good plus  -HP    Rt Shoulder External Rotation MMT, Gross Movement (4/5) good  -HP          User Key  (r) = Recorded By, (t) = Taken By, (c) = Cosigned By    Initials Name Provider Type    Abundio Sandhu PT Physical Therapist                             PT Assessment/Plan     Row  Name 11/10/22 0830          PT Assessment    Functional Limitations Limitations in functional capacity and performance;Performance in leisure activities;Performance in self-care ADL;Performance in work activities  -     Impairments Joint mobility;Muscle strength;Pain;Range of motion  -HP     Assessment Comments Pt continues to do well and tolerates movement well with decreased pain compared to previous sessions and her initial evaluation. OVerall, limited by strength deficit the greatest. Continued focus on areas of weakness and preparing pt for d/c to an independent HEP.  -HP     Please refer to paper survey for additional self-reported information Yes  -HP     Rehab Potential Good  -HP     Patient/caregiver participated in establishment of treatment plan and goals Yes  -HP     Patient would benefit from skilled therapy intervention Yes  -HP        PT Plan    PT Frequency 1x/week;2x/week  -HP     Predicted Duration of Therapy Intervention (PT) 4-6 visits  -     PT Plan Comments Pt to continue to benefit from further skilled therapy with a focus on improving AROM, strength, and returning to prior level of function.  -           User Key  (r) = Recorded By, (t) = Taken By, (c) = Cosigned By    Initials Name Provider Type     Abundio Peterson, PT Physical Therapist                   OP Exercises     Row Name 11/10/22 0830             Subjective Comments    Subjective Comments Pt states that she is doing well without any pain just soreness today  -HP         Subjective Pain    Able to rate subjective pain? yes  -HP      Pre-Treatment Pain Level 0  -HP      Post-Treatment Pain Level 0  -HP         Total Minutes    11210 - PT Therapeutic Exercise Minutes 38  -HP         Exercise 1    Exercise Name 1 Pulley in flexion, pull apart, abduction  -HP      Time 1 8 min total  -HP         Exercise 2    Exercise Name 2 Time updating objective measures and POC  -HP      Sets 2 2  -HP      Reps 2 10  -HP      Time 2 8 min   -HP         Exercise 3    Exercise Name 3 Standing lat pulldown and face pulls  -HP      Sets 3 2  -HP      Reps 3 10 each  -HP      Additional Comments 1pl/ 2pl  -HP         Exercise 4    Exercise Name 4 Ladder step back  -HP      Reps 4 10  -HP         Exercise 5    Exercise Name 5 Red band: rows, sweeps, IR, ER  -HP      Reps 5 10  -HP         Exercise 6    Exercise Name 6 Yellow band: shoulder flexion, scaption, abd  -HP      Reps 6 10  -HP         Exercise 7    Exercise Name 7 Yellow band: shoulder flexion with lift off, serratus wall slide  -HP      Sets 7 --  -HP      Reps 7 10  -HP         Exercise 8    Exercise Name 8 7.5#KB supine chest press and chest press +, shoulder flexion  -HP      Reps 8 10 each  -HP            User Key  (r) = Recorded By, (t) = Taken By, (c) = Cosigned By    Initials Name Provider Type    Abundio Sandhu, PT Physical Therapist                              PT OP Goals     Row Name 11/10/22 0830          PT Short Term Goals    STG Date to Achieve 11/03/22  -HP     STG 1 Patient to report improvement of symptoms by 50% or greater.  -HP     STG 1 Progress Met  -HP     STG 2 Patient to report adherence to HEP.  -HP     STG 2 Progress Met  -HP     STG 3 Patient to demonstrate full active range of motion of the right shoulder in all planes of motion without pain or symptoms.  -HP     STG 3 Progress Met  -HP        Long Term Goals    LTG Date to Achieve 12/01/22  -HP     LTG 1 Patient to report improvement of symptoms by 75% or greater.  -HP     LTG 1 Progress Met  -HP     LTG 2 Patient to report independence with HEP.  -HP     LTG 2 Progress Met  -HP     LTG 3 Patient to report return to leisure activities/ADLs without pain or symptoms in the right shoulder.  -HP     LTG 3 Progress Ongoing  -HP     LTG 4 Patient to report decrease in QuickDASH score to less than or equal to 10.  -HP     LTG 4 Progress Ongoing  -HP        Time Calculation    PT Goal Re-Cert Due Date 01/04/22  -HP            User Key  (r) = Recorded By, (t) = Taken By, (c) = Cosigned By    Initials Name Provider Type     Abundio Peterson, PT Physical Therapist                     Outcome Measure Options: Quick DASH  Quick DASH  Open a tight or new jar.: No Difficulty  Do heavy household chores (e.g., wash walls, wash floors): No Difficulty  Carry a shopping bag or briefcase: Mild Difficulty  Wash your back: Mild Difficulty  Use a knife to cut food: No Difficulty  Recreational activities in which you take some force or impact through your arm, should or hand (e.g. golf, hammering, tennis, etc.): Mild Difficulty  During the past week, to what extent has your arm, shoulder, or hand problem interfered with your normal social activites with family, friends, neighbors or groups?: Slightly  During the past week, were you limited in your work or other regular daily activities as a result of your arm, shoulder or hand problem?: Not limited at all  Arm, Shoulder, or hand pain: Mild  Tingling (pins and needles) in your arm, shoulder, or hand: None  During the past week, how much difficulty have you had sleeping because of the pain in your arm, shoulder or hand?: No difficulty  Number of Questions Answered: 11  Quick DASH Score: 11.36         Time Calculation:   Start Time: 0830  Timed Charges  83461 - PT Therapeutic Exercise Minutes: 38  Total Minutes  Timed Charges Total Minutes: 38   Total Minutes: 38  Therapy Charges for Today     Code Description Service Date Service Provider Modifiers Qty    34163753881  PT THER PROC EA 15 MIN 11/10/2022 Abundio Peterson, PT GP 3          PT G-Codes  Outcome Measure Options: Quick DASH  Quick DASH Score: 11.36         Abundio Peterson PT  11/10/2022

## 2022-11-22 ENCOUNTER — HOSPITAL ENCOUNTER (OUTPATIENT)
Dept: PHYSICAL THERAPY | Facility: HOSPITAL | Age: 57
Setting detail: THERAPIES SERIES
Discharge: HOME | End: 2022-11-22

## 2022-11-22 DIAGNOSIS — S42.254D NONDISPLACED FRACTURE OF GREATER TUBEROSITY OF RIGHT HUMERUS, SUBSEQUENT ENCOUNTER FOR FRACTURE WITH ROUTINE HEALING: Primary | ICD-10-CM

## 2022-11-22 PROCEDURE — 97110 THERAPEUTIC EXERCISES: CPT | Performed by: GENERAL ACUTE CARE HOSPITAL

## 2022-11-22 NOTE — THERAPY DISCHARGE NOTE
Outpatient Physical Therapy Ortho Progress Note/Discharge Summary   [facility]     Patient Name: Jasmin Zazueta  : 1965  MRN: [id number]  Today's Date: 2022      Visit Date: 2022    Visit Dx:    ICD-10-CM ICD-9-CM   1. Nondisplaced fracture of greater tuberosity of right humerus, subsequent encounter for fracture with routine healing  S42.254D V54.11       Patient Active Problem List   Diagnosis   • Chronic seasonal allergic rhinitis   • Hypertension   • Hypothyroid   • EDUARDO (stress urinary incontinence, female)        Past Medical History:   Diagnosis Date   • Disease of thyroid gland    • Dislocated shoulder    • Hypertension    • Knee pain         Past Surgical History:   Procedure Laterality Date   • TONSILLECTOMY          PT Ortho     Row Name 22 1030       Subjective Comments    Subjective Comments Pt states that she is doing very well and no complaints at this time  -HP       Subjective Pain    Able to rate subjective pain? yes  -HP    Pre-Treatment Pain Level 0  -HP    Post-Treatment Pain Level 0  -HP       General ROM    GENERAL ROM COMMENTS WNL AROM of the R shoulder  -HP       MMT (Manual Muscle Testing)    General MMT Comments WNL MMT of the R shoulder  -HP          User Key  (r) = Recorded By, (t) = Taken By, (c) = Cosigned By    Initials Name Provider Type    Abundio Sandhu PT Physical Therapist                             PT Assessment/Plan     Row Name 22 1030          PT Assessment    Assessment Comments Patient arrived without any complaints of pain or soreness in the right shoulder.  Overall, patient is doing very well and feels comfortable with today being her last visit.  Patient was able to achieve all of her short-term and long-term goals.  She demonstrates full active range of motion of the right shoulder and within normal limits MMT of the right shoulder as well.  Patient to be discharged at this time to an independent [facility] to allow for progression on her  own with time.  -HP     Please refer to paper survey for additional self-reported information Yes  -HP     Rehab Potential Excellent  -HP     Patient/caregiver participated in establishment of treatment plan and goals Yes  -HP     Patient would benefit from skilled therapy intervention No  -HP        PT Plan    PT Plan Comments Pt to be d/c at this time to an independent HEP  -HP           User Key  (r) = Recorded By, (t) = Taken By, (c) = Cosigned By    Initials Name Provider Type     Abundio Peterson, PT Physical Therapist                     OP Exercises     Row Name 11/22/22 1030             Subjective Comments    Subjective Comments Pt states that she is doing very well and no complaints at this time  -HP         Subjective Pain    Able to rate subjective pain? yes  -HP      Pre-Treatment Pain Level 0  -HP      Post-Treatment Pain Level 0  -HP         Total Minutes    33414 - PT Therapeutic Exercise Minutes 40  -HP         Exercise 1    Exercise Name 1 Pulley in flexion  -HP      Time 1 5 min  -HP         Exercise 2    Exercise Name 2 Time updating objective measures and POC  -HP      Time 2 8 min  -HP         Exercise 3    Exercise Name 3 REd band: rows, shoulder flexion/scaption/abduction, pull apart, no money  -HP      Reps 3 10 each  -HP         Exercise 4    Exercise Name 4 Yellow band serratus wall slide  -HP      Reps 4 10  -HP         Exercise 5    Exercise Name 5 Doorway stretch  -HP      Reps 5 4  -HP      Time 5 30 sec  -HP            User Key  (r) = Recorded By, (t) = Taken By, (c) = Cosigned By    Initials Name Provider Type     Abundio Peterson, PT Physical Therapist                                PT OP Goals     Row Name 11/22/22 1030          PT Short Term Goals    STG Date to Achieve 11/03/22  -HP     STG 1 Patient to report improvement of symptoms by 50% or greater.  -HP     STG 1 Progress Met  -HP     STG 2 Patient to report adherence to HEP.  -HP     STG 2 Progress Met  -HP     STG 3 Patient  to demonstrate full active range of motion of the right shoulder in all planes of motion without pain or symptoms.  -     STG 3 Progress Met  -        Long Term Goals    LTG Date to Achieve 12/01/22  -HP     LTG 1 Patient to report improvement of symptoms by 75% or greater.  -HP     LTG 1 Progress Met  -     LTG 2 Patient to report independence with HEP.  -HP     LTG 2 Progress Met  -HP     LTG 3 Patient to report return to leisure activities/ADLs without pain or symptoms in the right shoulder.  -HP     LTG 3 Progress Met  -     LTG 4 Patient to report decrease in QuickDASH score to less than or equal to 10.  -     LTG 4 Progress Met  -        Time Calculation    PT Goal Re-Cert Due Date 01/04/22  -           User Key  (r) = Recorded By, (t) = Taken By, (c) = Cosigned By    Initials Name Provider Type    Abundio Sandhu, JAKE Physical Therapist                     Outcome Measure Options: Quick DASH  Quick DASH  Open a tight or new jar.: No Difficulty  Do heavy household chores (e.g., wash walls, wash floors): No Difficulty  Carry a shopping bag or briefcase: No Difficulty  Wash your back: No Difficulty  Use a knife to cut food: No Difficulty  Recreational activities in which you take some force or impact through your arm, should or hand (e.g. golf, hammering, tennis, etc.): Mild Difficulty  During the past week, to what extent has your arm, shoulder, or hand problem interfered with your normal social activites with family, friends, neighbors or groups?: Not at all  During the past week, were you limited in your work or other regular daily activities as a result of your arm, shoulder or hand problem?: Not limited at all  Arm, Shoulder, or hand pain: None  Tingling (pins and needles) in your arm, shoulder, or hand: None  During the past week, how much difficulty have you had sleeping because of the pain in your arm, shoulder or hand?: No difficulty  Number of Questions Answered: 11  Quick DASH Score:  2.27         Time Calculation:   Start Time: 1030  Timed Charges  17195 - PT Therapeutic Exercise Minutes: 40  Total Minutes  Timed Charges Total Minutes: 40   Total Minutes: 40  Therapy Charges for Today     Code Description Service Date Service Provider Modifiers Qty    [id number] HC PT THER PROC EA 15 MIN 11/22/2022 Abundio Peterson, PT GP 3          PT G-Codes  Outcome Measure Options: Quick DASH  Quick DASH Score: 2.27     OP PT Discharge Summary  Date of Discharge: 11/22/22  Reason for Discharge: All goals achieved, Independent  Outcomes Achieved: Able to achieve all goals within established timeline  Discharge Destination: Home with home program  Discharge Instructions/Additional Comments: Patient to be discharged at this time to an independent HEP to allow for progression on her own and with time.  Prognosis at time of discharge is excellent based off of patient status at last visit.      Abundio Peterson, PT  11/22/2022